# Patient Record
(demographics unavailable — no encounter records)

---

## 2024-10-24 NOTE — ASSESSMENT
[FreeTextEntry1] : 1. EKG today reveals normal sinus rhythm at 82 bpm. Normal intervals. No evidence of ischemia.   2. Hypertension: Blood pressure is borderline controlled at this time on current medications. A stricter low-salt diet and further weight-loss was discussed. We will continue to monitor closely. The patient previously discontinued Lisinopril therapy and wishes a natural approach to his blood pressure.   3. Hyperlipidemia: Review of recent blood work performed through his primary care physician's office reveals a total cholesterol of 203, HDL 46, , triglycerides 115. The patient self-discontinued Atorvastatin therapy. I have recommended a strict low-fat / low-cholesterol diet as well as continued exercise and weight-loss. This should be repeated in six months.   4. Diabetes mellitus: The patient continues to lower his hemoglobin A1C. Initially, it was at 10.4 and more recently in July, 6.5. It is now at 6.0. The patient is advised to continue with current low carb diet and regular aerobic exercise and follow up with his primary care physician. If clinically stable from a cardiac standpoint, we will reevaluate in six months.

## 2024-10-24 NOTE — REASON FOR VISIT
[FreeTextEntry1] : Mr. Ronnie Parra is a pleasant 36-year-old  male, native of [facility], with a recent diagnosis of new onset hypertension and borderline abnormal EKG, who presents for follow up evaluation.

## 2024-10-24 NOTE — HISTORY OF PRESENT ILLNESS
[FreeTextEntry1] : Mr. Ronnie Parra presents today without cardiac related complaints. He is exercising on a regular basis and is following a strict low-carbohydrate diet and has managed to lose almost 30 lbs. since his initial presentation here.

## 2024-10-24 NOTE — REASON FOR VISIT
[FreeTextEntry1] : Mr. Ronnie Parra is a pleasant 36-year-old  male, native of [facility], with a recent diagnosis of new onset hypertension and borderline abnormal EKG, who presents for follow up evaluation.

## 2025-04-23 NOTE — ASSESSMENT
[FreeTextEntry1] : 1. EKG today reveals normal sinus rhythm at 83bpm. Normal intervals.  There is no evidence of ischemia.   2. Hypertension: Blood pressure is controlled at this time on current medications.  The patient recently had his lisinopril increased from 10 to 20mg daily by his PCP.  A strict low salt diet and weight loss was discussed.   3. Diabetes mellitus: The patient continues to follow a strict low carbohydrate diet. Recent blood work revealed a fasting glucose of 112 and a hemoglobin A1c of 5.8. This is a marked improvement when compared to his hemoglobin A1c last year of 10.5. A strict low carbohydrate diet was again discussed.   4. Fasting lipid profile reveals a total cholesterol of 193, HDL 49, TC/HDL ratio 3.9, , triglycerides 168. The patient is advised on a stricter low fat/low cholesterol diet and regular aerobic exercise. We will re-evaluate his lipid profile in approximately 3-4 months.

## 2025-04-23 NOTE — HISTORY OF PRESENT ILLNESS
[FreeTextEntry1] : Mr. Ronnie Parra presents today without complaints of chest pain, shortness of breath, or other cardiac symptoms. He states that he has been following a strict low carbohydrate diet and has managed to lose considerable weight as well as improve his underlying diabetes.

## 2025-04-23 NOTE — REASON FOR VISIT
[FreeTextEntry1] : Mr. Ronnie Parra is a pleasant 37-year-old  male, native of [facility], with a past medical history significant for hypertension, noninsulin dependent diabetes mellitus and a borderline abnormal EKG who presents today for follow up evaluation.